# Patient Record
Sex: FEMALE | URBAN - METROPOLITAN AREA
[De-identification: names, ages, dates, MRNs, and addresses within clinical notes are randomized per-mention and may not be internally consistent; named-entity substitution may affect disease eponyms.]

---

## 2020-11-04 ENCOUNTER — APPOINTMENT (RX ONLY)
Dept: URBAN - METROPOLITAN AREA CLINIC 55 | Facility: CLINIC | Age: 45
Setting detail: DERMATOLOGY
End: 2020-11-04

## 2020-11-04 DIAGNOSIS — Z41.9 ENCOUNTER FOR PROCEDURE FOR PURPOSES OTHER THAN REMEDYING HEALTH STATE, UNSPECIFIED: ICD-10-CM

## 2020-11-04 PROCEDURE — ? COSMETIC CONSULTATION - ULTHERAPY

## 2020-11-04 PROCEDURE — ? DYSPORT

## 2020-11-04 NOTE — PROCEDURE: DYSPORT
Show Right And Left Periorbital Units: No
R Brow Units: 0
Show Glabellar Units: Yes
Dilution (U/0.1 Cc): 10
Price (Use Numbers Only, No Special Characters Or $): 264.00
Lot #: [id number]
Post-Care Instructions: Patient instructed to not lie down for 4 hours and limit physical activity for 24 hours. Follow up 7 days if needed.
Expiration Date (Month Year): 6/30/2021
Consent: Written consent obtained. Risks include but not limited to lid/brow ptosis, bruising, swelling, diplopia, temporary effect, incomplete chemical denervation.
Detail Level: Detailed

## 2021-05-12 ENCOUNTER — APPOINTMENT (RX ONLY)
Dept: URBAN - METROPOLITAN AREA CLINIC 55 | Facility: CLINIC | Age: 46
Setting detail: DERMATOLOGY
End: 2021-05-12

## 2021-05-12 DIAGNOSIS — Z41.9 ENCOUNTER FOR PROCEDURE FOR PURPOSES OTHER THAN REMEDYING HEALTH STATE, UNSPECIFIED: ICD-10-CM

## 2021-05-12 PROCEDURE — ? FILLERS

## 2021-05-12 PROCEDURE — ? DYSPORT

## 2021-05-12 NOTE — PROCEDURE: DYSPORT
[facility] Units: 0
Show Orbicularis Oculi Units: Yes
Periorbital Skin Units: 20
Show Mentalis Units: No
Lot #: [id number]
Expiration Date (Month Year): 11/30/2021
Post-Care Instructions: After care instructions were provided to the patient.
Detail Level: Simple
Dilution (U/0.1 Cc): 10
Consent: Written consent was obtained.
Additional Area 1 Location: upper lip
Forehead Units: 5

## 2021-05-12 NOTE — PROCEDURE: FILLERS
Lot #: [id number]
Marionette Lines Filler Volume In Cc: 0
Anesthesia Volume In Cc: 0.5
Filler: Isamar Fernandez
Post-Care Instructions: After care instructions were provided to the patient.
Expiration Date (Month Year): 11/30/2023
Include Cannula Information In Note?: No
Include Cannula Size?: 27G
Map Statment: See Attach Map for Complete Details
Consent: Written consent was obtained.
Anesthesia Type: 1% lidocaine with epinephrine
Detail Level: Simple

## 2021-09-15 ENCOUNTER — APPOINTMENT (RX ONLY)
Dept: URBAN - METROPOLITAN AREA CLINIC 55 | Facility: CLINIC | Age: 46
Setting detail: DERMATOLOGY
End: 2021-09-15

## 2021-09-15 DIAGNOSIS — Z41.9 ENCOUNTER FOR PROCEDURE FOR PURPOSES OTHER THAN REMEDYING HEALTH STATE, UNSPECIFIED: ICD-10-CM

## 2021-09-15 PROCEDURE — ? DYSPORT

## 2021-09-15 ASSESSMENT — LOCATION SIMPLE DESCRIPTION DERM: LOCATION SIMPLE: RIGHT FOREHEAD

## 2021-09-15 ASSESSMENT — LOCATION ZONE DERM: LOCATION ZONE: FACE

## 2021-09-15 ASSESSMENT — LOCATION DETAILED DESCRIPTION DERM: LOCATION DETAILED: RIGHT LATERAL FOREHEAD

## 2021-09-15 NOTE — PROCEDURE: DYSPORT
Masseter Units: 0
Lot #: [id number]
Show Right And Left Periorbital Units: No
Consent: Written consent obtained. Risks include but not limited to lid/brow ptosis, bruising, swelling, diplopia, temporary effect, incomplete chemical denervation.
Additional Area 2 Location: lip
Show Depressor Anguli Units: Yes
Periorbital Skin Units: 20
Detail Level: Detailed
Dilution (U/0.1 Cc): 10
Expiration Date (Month Year): 03/31/2022
Additional Area 3 Location: masseter
Additional Area 1 Location: Chin
Post-Care Instructions: Patient instructed to not lie down for 4 hours and limit physical activity for 24 hours.
Forehead Units: 17.5

## 2021-12-09 ENCOUNTER — APPOINTMENT (RX ONLY)
Dept: URBAN - METROPOLITAN AREA CLINIC 55 | Facility: CLINIC | Age: 46
Setting detail: DERMATOLOGY
End: 2021-12-09

## 2021-12-09 DIAGNOSIS — Z41.9 ENCOUNTER FOR PROCEDURE FOR PURPOSES OTHER THAN REMEDYING HEALTH STATE, UNSPECIFIED: ICD-10-CM

## 2021-12-09 PROCEDURE — ? FILLERS

## 2021-12-09 PROCEDURE — ? DYSPORT

## 2021-12-09 NOTE — PROCEDURE: DYSPORT
Lateral Platysmal Bands Units: 0
Show Forehead Units: Yes
Show Ucl Units: No
Forehead Units: 12.5
Additional Area 2 Location: chin
Detail Level: Simple
Lot #: [id number]
Dilution (U/0.1 Cc): 10
Consent: Written consent was obtained.
Post-Care Instructions: After care instructions were provided to the patient.
Expiration Date (Month Year): 7/31/2022
Periorbital Skin Units: 20
Additional Area 1 Location: upper lip

## 2021-12-09 NOTE — PROCEDURE: FILLERS
Additional Area 3 Volume In Cc: 0
Use Map Statement For Sites (Optional): No
Consent: Written consent was obtained.
Map Statment: See Attach Map for Complete Details
Include Cannula Size?: 27G
Lot #: [id number]
Filler: Isamar Fernandez
Expiration Date (Month Year): 5/31/2024
Post-Care Instructions: After care instructions were provided to the patient.
Detail Level: Simple
Include Cannula Information In Note?: Yes
Anesthesia Volume In Cc: 0.5
Anesthesia Type: 1% lidocaine with epinephrine

## 2022-03-10 ENCOUNTER — APPOINTMENT (RX ONLY)
Dept: URBAN - METROPOLITAN AREA CLINIC 55 | Facility: CLINIC | Age: 47
Setting detail: DERMATOLOGY
End: 2022-03-10

## 2022-03-10 DIAGNOSIS — Z41.9 ENCOUNTER FOR PROCEDURE FOR PURPOSES OTHER THAN REMEDYING HEALTH STATE, UNSPECIFIED: ICD-10-CM

## 2022-03-10 PROCEDURE — ? DYSPORT

## 2022-03-10 NOTE — PROCEDURE: DYSPORT
L Brow Units: 0
Show Periorbital Units: Yes
Show Ucl Units: No
Expiration Date (Month Year): 03/31/2022
Additional Area 2 Location: lip
Consent: Written consent obtained. Risks include but not limited to lid/brow ptosis, bruising, swelling, diplopia, temporary effect, incomplete chemical denervation.
Forehead Units: 12.5
Lot #: [id number]
Post-Care Instructions: Patient instructed to not lie down for 4 hours and limit physical activity for 24 hours.
Additional Area 1 Location: Chin
Detail Level: Detailed
Additional Area 3 Location: masseter
Additional Area 4 Location: Nasalis
Dilution (U/0.1 Cc): 10
Periorbital Skin Units: 20

## 2022-04-19 ENCOUNTER — APPOINTMENT (RX ONLY)
Dept: URBAN - METROPOLITAN AREA CLINIC 55 | Facility: CLINIC | Age: 47
Setting detail: DERMATOLOGY
End: 2022-04-19

## 2022-04-19 DIAGNOSIS — Z41.9 ENCOUNTER FOR PROCEDURE FOR PURPOSES OTHER THAN REMEDYING HEALTH STATE, UNSPECIFIED: ICD-10-CM

## 2022-04-19 PROCEDURE — ? COSMETIC CONSULTATION: GENERAL

## 2022-07-07 ENCOUNTER — APPOINTMENT (RX ONLY)
Dept: URBAN - METROPOLITAN AREA CLINIC 55 | Facility: CLINIC | Age: 47
Setting detail: DERMATOLOGY
End: 2022-07-07

## 2022-07-07 DIAGNOSIS — Z41.9 ENCOUNTER FOR PROCEDURE FOR PURPOSES OTHER THAN REMEDYING HEALTH STATE, UNSPECIFIED: ICD-10-CM

## 2022-07-07 PROCEDURE — ? DYSPORT

## 2022-07-07 PROCEDURE — ? FILLERS

## 2022-07-07 NOTE — PROCEDURE: FILLERS
Marionette Lines Filler Volume In Cc: 0
Include Cannula Information In Note?: No
Post-Care Instructions: After care instructions were provided to the patient.
Aspiration Statement: Aspiration was performed prior to injecting site with filler.
Anesthesia Type: 1% lidocaine with epinephrine
Detail Level: Simple
Include Cannula Size?: 27G
Include Cannula Information In Note?: Yes
Filler: Restylane-L
Expiration Date (Month Year): 9/24
Map Statment: See Attach Map for Complete Details
Lot #: [id number]
Consent: Written consent was obtained.
Anesthesia Volume In Cc: 0.5

## 2022-07-07 NOTE — PROCEDURE: DYSPORT
Show Additional Area 3: Yes
Additional Area 1 Location: Chin
Show Right And Left Pupillary Line Units: No
Nasal Root Units: 0
Consent: Written consent obtained. Risks include but not limited to lid/brow ptosis, bruising, swelling, diplopia, temporary effect, incomplete chemical denervation.
Additional Area 2 Location: lip
Lot #: [id number]
Forehead Units: 17.5
Post-Care Instructions: Patient instructed to not lie down for 4 hours and limit physical activity for 24 hours.
Expiration Date (Month Year): 2/23
Detail Level: Detailed
Periorbital Skin Units: 20
Additional Area 3 Location: masseter
Dilution (U/0.1 Cc): 10
Additional Area 4 Location: Nasalis

## 2022-10-06 ENCOUNTER — APPOINTMENT (RX ONLY)
Dept: URBAN - METROPOLITAN AREA CLINIC 55 | Facility: CLINIC | Age: 47
Setting detail: DERMATOLOGY
End: 2022-10-06

## 2022-10-06 DIAGNOSIS — Z41.9 ENCOUNTER FOR PROCEDURE FOR PURPOSES OTHER THAN REMEDYING HEALTH STATE, UNSPECIFIED: ICD-10-CM

## 2022-10-06 PROCEDURE — ? DYSPORT

## 2022-10-06 NOTE — PROCEDURE: DYSPORT
Consent: Verbal consent obtained. Risks include but not limited to lid/brow ptosis, bruising, swelling, diplopia, temporary effect, incomplete chemical denervation.
Show Levator Superior Units: Yes
Forehead Units: 17.5
Additional Area 1 Location: Upper lip
Dilution (U/0.1 Cc): 10
Glabellar Complex Units: 0
Show Lcl Units: No
Post-Care Instructions: Patient instructed to not lie down for 4 hours and limit physical activity for 24 hours.
Show Inventory Tab: Show
Additional Area 2 Location: chin
Detail Level: Detailed
Periorbital Skin Units: 20

## 2023-01-06 ENCOUNTER — APPOINTMENT (RX ONLY)
Dept: URBAN - METROPOLITAN AREA CLINIC 55 | Facility: CLINIC | Age: 48
Setting detail: DERMATOLOGY
End: 2023-01-06

## 2023-01-06 DIAGNOSIS — Z41.9 ENCOUNTER FOR PROCEDURE FOR PURPOSES OTHER THAN REMEDYING HEALTH STATE, UNSPECIFIED: ICD-10-CM

## 2023-01-06 PROCEDURE — ? INVENTORY

## 2023-01-06 PROCEDURE — ? DYSPORT

## 2023-01-06 PROCEDURE — ? FILLERS

## 2023-01-06 NOTE — PROCEDURE: FILLERS
Additional Area 3 Volume In Cc: 0
Include Cannula Information In Note?: No
Consent was obtained.
Include Cannula Information In Note?: Yes
Detail Level: Detailed
Post-Care Instructions: After care instructions were provided verbally and in writing.
Include Cannula Size?: 27G
Filler: Isamar Fernandez
Map Statment: See Attach Map for Complete Details
Anesthesia Type: 1% lidocaine with epinephrine
Anesthesia Volume In Cc: 0.5

## 2023-01-06 NOTE — PROCEDURE: DYSPORT
Right Pupillary Line Units: 0
Show Masseter Units: Yes
Show Right And Left Pupillary Line Units: No
Show Inventory Tab: Show
Dilution (U/0.1 Cc): 10
Consent was obtained.
Periorbital Skin Units: 20
Post-Care Instructions: Patient instructed to not lie down for 4 hours and limit physical activity for 24 hours.
Detail Level: Detailed
Forehead Units: 17.5

## 2023-04-12 ENCOUNTER — APPOINTMENT (RX ONLY)
Dept: URBAN - METROPOLITAN AREA CLINIC 55 | Facility: CLINIC | Age: 48
Setting detail: DERMATOLOGY
End: 2023-04-12

## 2023-04-12 DIAGNOSIS — Z41.9 ENCOUNTER FOR PROCEDURE FOR PURPOSES OTHER THAN REMEDYING HEALTH STATE, UNSPECIFIED: ICD-10-CM

## 2023-04-12 PROCEDURE — ? DYSPORT

## 2023-04-12 NOTE — PROCEDURE: DYSPORT
Left Pupillary Line Units: 0
Show Right And Left Periorbital Units: No
Show Masseter Units: Yes
Post-Care Instructions: Patient instructed to not lie down for 4 hours and limit physical activity for 24 hours.
Forehead Units: 17.5
Consent was obtained.
Detail Level: Detailed
Dilution (U/0.1 Cc): 10
Show Inventory Tab: Show
Periorbital Skin Units: 20

## 2023-06-22 ENCOUNTER — APPOINTMENT (RX ONLY)
Dept: URBAN - METROPOLITAN AREA CLINIC 55 | Facility: CLINIC | Age: 48
Setting detail: DERMATOLOGY
End: 2023-06-22

## 2023-06-22 DIAGNOSIS — Z41.9 ENCOUNTER FOR PROCEDURE FOR PURPOSES OTHER THAN REMEDYING HEALTH STATE, UNSPECIFIED: ICD-10-CM

## 2023-06-22 PROCEDURE — ? INVENTORY

## 2023-06-22 PROCEDURE — ? FILLERS

## 2023-06-22 NOTE — PROCEDURE: FILLERS
Vermilion Lips Filler Volume In Cc: 0
Filler: RHA Redensity
Map Statment: See Attach Map for Complete Details
Include Cannula Information In Note?: No
Anesthesia Type: 1% lidocaine with epinephrine
Include Cannula Information In Note?: Yes
Anesthesia Volume In Cc: 0.5
Include Cannula Size?: 27G
Detail Level: Detailed
Consent was obtained.
Post-Care Instructions: After care instructions were provided verbally and in writing.

## 2023-08-10 ENCOUNTER — APPOINTMENT (RX ONLY)
Dept: URBAN - METROPOLITAN AREA CLINIC 55 | Facility: CLINIC | Age: 48
Setting detail: DERMATOLOGY
End: 2023-08-10

## 2023-08-10 DIAGNOSIS — Z41.9 ENCOUNTER FOR PROCEDURE FOR PURPOSES OTHER THAN REMEDYING HEALTH STATE, UNSPECIFIED: ICD-10-CM

## 2023-08-10 PROCEDURE — ? INVENTORY

## 2023-08-10 PROCEDURE — ? FILLERS

## 2023-08-10 PROCEDURE — ? DYSPORT

## 2023-08-10 NOTE — PROCEDURE: FILLERS
Dorsal Hands Filler Volume In Cc: 0
Include Cannula Information In Note?: Yes
Anesthesia Type: 1% lidocaine with epinephrine
Anesthesia Volume In Cc: 0.5
Include Cannula Size?: 27G
Include Cannula Information In Note?: No
Detail Level: Detailed
Consent was obtained.
Post-Care Instructions: After care instructions were provided verbally and in writing.
Map Statment: See Attach Map for Complete Details
Filler: Isamar Fernandez

## 2023-08-10 NOTE — PROCEDURE: DYSPORT
Show Periorbital Units: Yes
[facility] Units: 0
Consent was obtained.
Show Ucl Units: No
Post-Care Instructions: Patient instructed to not lie down for 4 hours and limit physical activity for 24 hours.
Forehead Units: 17.5
Detail Level: Detailed
Dilution (U/0.1 Cc): 10
Show Inventory Tab: Show
Periorbital Skin Units: 20

## 2024-01-04 ENCOUNTER — APPOINTMENT (RX ONLY)
Dept: URBAN - METROPOLITAN AREA CLINIC 55 | Facility: CLINIC | Age: 49
Setting detail: DERMATOLOGY
End: 2024-01-04

## 2024-01-04 DIAGNOSIS — Z41.9 ENCOUNTER FOR PROCEDURE FOR PURPOSES OTHER THAN REMEDYING HEALTH STATE, UNSPECIFIED: ICD-10-CM

## 2024-01-04 PROCEDURE — ? FILLERS

## 2024-01-04 PROCEDURE — ? DYSPORT

## 2024-01-04 PROCEDURE — ? INVENTORY

## 2024-01-04 NOTE — PROCEDURE: FILLERS
Nasolabial Folds Filler Volume In Cc: 0
Include Cannula Information In Note?: Yes
Include Cannula Information In Note?: No
Include Cannula Size?: 27G
Anesthesia Type: 1% lidocaine with epinephrine
Anesthesia Volume In Cc: 0.5
Additional Anesthesia Volume In Cc: 6
Detail Level: Detailed
Filler: RHA 3
Consent: Written consent obtained. Risks include but not limited to bruising, beading, irregular texture, ulceration, infection, allergic reaction, scar formation, incomplete augmentation, temporary nature, and procedural pain.
Map Statment: See Attach Map for Complete Details
Post-Care Instructions: After the procedure, patient instructed to apply ice to reduce swelling.

## 2024-01-04 NOTE — PROCEDURE: DYSPORT
Lateral Platysmal Bands Units: 0
Show Depressor Anguli Units: Yes
Show Right And Left Pupillary Line Units: No
Additional Area 1 Location: Upper lip
Periorbital Skin Units: 20
Consent: Verbal consent obtained. Risks include but not limited to lid/brow ptosis, bruising, swelling, diplopia, temporary effect, incomplete chemical denervation.
Additional Area 2 Location: chin
Post-Care Instructions: Patient instructed to not lie down for 4 hours and limit physical activity for 24 hours.
Dilution (U/0.1 Cc): 10
Forehead Units: 17.5
Detail Level: Detailed
Show Inventory Tab: Show

## 2024-08-15 ENCOUNTER — APPOINTMENT (RX ONLY)
Dept: URBAN - METROPOLITAN AREA CLINIC 55 | Facility: CLINIC | Age: 49
Setting detail: DERMATOLOGY
End: 2024-08-15

## 2024-08-15 DIAGNOSIS — Z41.9 ENCOUNTER FOR PROCEDURE FOR PURPOSES OTHER THAN REMEDYING HEALTH STATE, UNSPECIFIED: ICD-10-CM

## 2024-08-15 PROCEDURE — ? FILLERS

## 2024-08-15 PROCEDURE — ? DYSPORT

## 2024-08-15 PROCEDURE — ? INVENTORY

## 2024-08-15 NOTE — PROCEDURE: DYSPORT
Post-Care Instructions: Patient instructed to not lie down for 4 hours and limit physical activity for 24 hours.
Lateral Platysmal Bands Units: 0
Show Right And Left Periorbital Units: No
Show Additional Area 5: Yes
Forehead Units: 17.5
Additional Area 1 Location: Upper lip
Detail Level: Detailed
Additional Area 2 Location: chin
Show Inventory Tab: Show
Additional Area 3 Location: DLI
Dilution (U/0.1 Cc): 10
Periorbital Skin Units: 20
Consent: Verbal consent obtained. Risks include but not limited to lid/brow ptosis, bruising, swelling, diplopia, temporary effect, incomplete chemical denervation.

## 2024-08-15 NOTE — PROCEDURE: FILLERS
Include Cannula Information In Note?: No
Marionette Lines Filler Volume In Cc: 0
Include Cannula Size?: 25G
Additional Anesthesia Volume In Cc: 6
Detail Level: Detailed
Filler: RHA 3
Map Statment: See Attach Map for Complete Details
Include Cannula Information In Note?: Yes
Consent: Verbal consent obtained, risks reviewed.
Post-Care Instructions: After the procedure, patient instructed to apply ice to reduce swelling.
Include Cannula Size?: 27G
Anesthesia Type: 1% lidocaine with epinephrine
Anesthesia Volume In Cc: 0.5

## 2024-11-21 ENCOUNTER — APPOINTMENT (RX ONLY)
Dept: URBAN - METROPOLITAN AREA CLINIC 55 | Facility: CLINIC | Age: 49
Setting detail: DERMATOLOGY
End: 2024-11-21

## 2024-12-12 ENCOUNTER — APPOINTMENT (OUTPATIENT)
Dept: URBAN - METROPOLITAN AREA CLINIC 55 | Facility: CLINIC | Age: 49
Setting detail: DERMATOLOGY
End: 2024-12-12

## 2024-12-12 DIAGNOSIS — Z41.9 ENCOUNTER FOR PROCEDURE FOR PURPOSES OTHER THAN REMEDYING HEALTH STATE, UNSPECIFIED: ICD-10-CM

## 2024-12-12 PROCEDURE — ? DYSPORT

## 2024-12-12 NOTE — PROCEDURE: DYSPORT
Forehead Units: 25
Additional Area 1 Location: Upper lip
Show Additional Area 4: Yes
Glabellar Complex Units: 0
Dilution (U/0.1 Cc): 10
Consent: Verbal consent obtained. Risks include but not limited to lid/brow ptosis, bruising, swelling, diplopia, temporary effect, incomplete chemical denervation.
Show Inventory Tab: Show
Show Right And Left Brow Units: No
Additional Area 2 Location: chin
Post-Care Instructions: Patient instructed to not lie down for 4 hours and limit physical activity for 24 hours.
Periorbital Skin Units: 20
Additional Area 3 Location: DLI
Detail Level: Detailed

## 2025-07-17 ENCOUNTER — APPOINTMENT (OUTPATIENT)
Dept: URBAN - METROPOLITAN AREA CLINIC 55 | Facility: CLINIC | Age: 50
Setting detail: DERMATOLOGY
End: 2025-07-17

## 2025-07-17 DIAGNOSIS — Z41.9 ENCOUNTER FOR PROCEDURE FOR PURPOSES OTHER THAN REMEDYING HEALTH STATE, UNSPECIFIED: ICD-10-CM

## 2025-07-17 PROCEDURE — ? DYSPORT

## 2025-07-17 PROCEDURE — ? INVENTORY

## 2025-07-17 PROCEDURE — ? FILLERS

## 2025-07-17 NOTE — PROCEDURE: DYSPORT
Show Forehead Units: Yes
Lcl Root Units: 0
Show Mentalis Units: No
Detail Level: Detailed
Forehead Units: 25
Additional Area 1 Location: Chin
Show Inventory Tab: Show
Consent was obtained.
Periorbital Skin Units: 20
Post-Care Instructions: Patient instructed to not lie down for 4 hours and limit physical activity for 24 hours.
Dilution (U/0.1 Cc): 10

## 2025-07-17 NOTE — PROCEDURE: FILLERS
[place] Filler Volume In Cc: 0
Include Cannula Size?: 27G
Anesthesia Type: 1% lidocaine with epinephrine
Include Cannula Information In Note?: No
Anesthesia Volume In Cc: 0.5
Detail Level: Detailed
Consent was obtained.
Post-Care Instructions: After care instructions were provided verbally and in writing.
Filler: RHA 3
Map Statment: See Attach Map for Complete Details
Include Cannula Information In Note?: Yes